# Patient Record
Sex: FEMALE | URBAN - METROPOLITAN AREA
[De-identification: names, ages, dates, MRNs, and addresses within clinical notes are randomized per-mention and may not be internally consistent; named-entity substitution may affect disease eponyms.]

---

## 2017-03-03 ENCOUNTER — APPOINTMENT (RX ONLY)
Dept: URBAN - METROPOLITAN AREA CLINIC 78 | Facility: CLINIC | Age: 52
Setting detail: DERMATOLOGY
End: 2017-03-03

## 2017-03-03 DIAGNOSIS — L20.89 OTHER ATOPIC DERMATITIS: ICD-10-CM

## 2017-03-03 PROBLEM — L30.9 DERMATITIS, UNSPECIFIED: Status: ACTIVE | Noted: 2017-03-03

## 2017-03-03 PROCEDURE — 99212 OFFICE O/P EST SF 10 MIN: CPT

## 2017-03-03 PROCEDURE — ? TREATMENT REGIMEN

## 2017-03-03 ASSESSMENT — LOCATION DETAILED DESCRIPTION DERM
LOCATION DETAILED: LEFT RADIAL PALM
LOCATION DETAILED: LEFT ULNAR DORSAL HAND
LOCATION DETAILED: RIGHT RADIAL PALM
LOCATION DETAILED: RIGHT RADIAL DORSAL HAND

## 2017-03-03 ASSESSMENT — LOCATION SIMPLE DESCRIPTION DERM
LOCATION SIMPLE: RIGHT HAND
LOCATION SIMPLE: LEFT HAND

## 2017-03-03 ASSESSMENT — LOCATION ZONE DERM: LOCATION ZONE: HAND

## 2023-07-13 NOTE — PROCEDURE: TREATMENT REGIMEN
Continue Regimen: Avoid eating gluten\\nUse Clobetasol oint bid prn
Detail Level: Zone
- left eye very inflamed appearing; has blurred/decreased vision  - patient reporting different diagnoses; cataract, glaucoma, rheumatoid arthritis  - on prednisone drops initially every 2 hours; patient reports using daily at present  - would get urgent Ophthalmology consult  - may need Rheumatology consult